# Patient Record
Sex: MALE | Race: BLACK OR AFRICAN AMERICAN | NOT HISPANIC OR LATINO
[De-identification: names, ages, dates, MRNs, and addresses within clinical notes are randomized per-mention and may not be internally consistent; named-entity substitution may affect disease eponyms.]

---

## 2024-02-27 ENCOUNTER — APPOINTMENT (OUTPATIENT)
Dept: COLORECTAL SURGERY | Facility: CLINIC | Age: 29
End: 2024-02-27
Payer: COMMERCIAL

## 2024-02-27 VITALS
WEIGHT: 165 LBS | OXYGEN SATURATION: 98 % | HEART RATE: 70 BPM | HEIGHT: 67.72 IN | DIASTOLIC BLOOD PRESSURE: 80 MMHG | RESPIRATION RATE: 16 BRPM | BODY MASS INDEX: 25.3 KG/M2 | SYSTOLIC BLOOD PRESSURE: 118 MMHG | TEMPERATURE: 98 F

## 2024-02-27 DIAGNOSIS — K64.4 RESIDUAL HEMORRHOIDAL SKIN TAGS: ICD-10-CM

## 2024-02-27 DIAGNOSIS — K64.1 SECOND DEGREE HEMORRHOIDS: ICD-10-CM

## 2024-02-27 DIAGNOSIS — K62.5 HEMORRHAGE OF ANUS AND RECTUM: ICD-10-CM

## 2024-02-27 DIAGNOSIS — K62.89 OTHER SPECIFIED DISEASES OF ANUS AND RECTUM: ICD-10-CM

## 2024-02-27 PROBLEM — Z00.00 ENCOUNTER FOR PREVENTIVE HEALTH EXAMINATION: Status: ACTIVE | Noted: 2024-02-27

## 2024-02-27 PROCEDURE — 99204 OFFICE O/P NEW MOD 45 MIN: CPT

## 2024-02-27 NOTE — HISTORY OF PRESENT ILLNESS
[FreeTextEntry1] : Karl Katz is a 28-year-old gentleman who presents for evaluation and management concerning chronic hemorrhoid engorgement skin tags associated discomfort.  Patient denies chronic constipation.  He denies a history of first-degree relatives with colorectal cancer.  He himself has never undergone colonoscopic or endoscopic evaluation.

## 2024-02-27 NOTE — PHYSICAL EXAM
[FreeTextEntry1] : Anorectal examination includes visual, digital rectal exam, anoscopic exam.  There is a chronic right posterior internal/external hemorrhoid engorgement column.  Left lateral external hemorrhoids exceed right anterior.  Digital rectal exam is without palpable mass or tenderness.  Normal sphincter tone.  Anoscopic exam reveals again the excoriation engorgement of chronic internal/external hemorrhoid prominence.

## 2024-02-27 NOTE — CONSULT LETTER
[Dear  ___] : Dear  [unfilled], [Sincerely,] : Sincerely, [FreeTextEntry2] : Mikal Summers MD [FreeTextEntry1] :  Many thanks for your kind referral.  Karl suffers chronic symptomatic internal/external skin tags and  hemorrhoids.   Office management is limited.  I discussed with him surgical care.  He is requesting we move forward with surgical scheduling.  I will return to you with the results of treatment.  Many thanks.  I appreciate the opportunity to participate in the care of your patients. - Emilio [FreeTextEntry3] : Emilio Bauer MD

## 2024-02-27 NOTE — ASSESSMENT
[FreeTextEntry1] : 28-year-old gentleman with chronic and symptomatic hemorrhoidal issues.  Given the involvement of the external component, office management is problematic.  I discussed with him surgical hemorrhoidectomy.  Written information on postoperative recovery provided to patient.  He is requesting we move forward with surgical scheduling at this time.